# Patient Record
Sex: FEMALE | Race: BLACK OR AFRICAN AMERICAN | URBAN - METROPOLITAN AREA
[De-identification: names, ages, dates, MRNs, and addresses within clinical notes are randomized per-mention and may not be internally consistent; named-entity substitution may affect disease eponyms.]

---

## 2023-06-13 ENCOUNTER — OFFICE VISIT (OUTPATIENT)
Dept: URBAN - METROPOLITAN AREA CLINIC 92 | Facility: CLINIC | Age: 48
End: 2023-06-13

## 2023-07-21 ENCOUNTER — OFFICE VISIT (OUTPATIENT)
Dept: URBAN - METROPOLITAN AREA CLINIC 92 | Facility: CLINIC | Age: 48
End: 2023-07-21
Payer: COMMERCIAL

## 2023-07-21 ENCOUNTER — DASHBOARD ENCOUNTERS (OUTPATIENT)
Age: 48
End: 2023-07-21

## 2023-07-21 ENCOUNTER — WEB ENCOUNTER (OUTPATIENT)
Dept: URBAN - METROPOLITAN AREA CLINIC 92 | Facility: CLINIC | Age: 48
End: 2023-07-21

## 2023-07-21 VITALS
WEIGHT: 215 LBS | SYSTOLIC BLOOD PRESSURE: 139 MMHG | TEMPERATURE: 97.7 F | HEIGHT: 67 IN | BODY MASS INDEX: 33.74 KG/M2 | DIASTOLIC BLOOD PRESSURE: 89 MMHG | HEART RATE: 83 BPM

## 2023-07-21 DIAGNOSIS — Z80.0 FAMILY HISTORY OF COLON CANCER IN FATHER: ICD-10-CM

## 2023-07-21 PROCEDURE — 99202 OFFICE O/P NEW SF 15 MIN: CPT

## 2023-07-21 NOTE — HPI-TODAY'S VISIT:
48 year-old female who presents for a colon cancer screening.   Last colonoscopy: never  Father with colon CA age 80s?  has regular BM daily. Patient denies nausea, heartburn, dysphagia, abdominal pain, rectal bleeding, melena, unintentional weight loss, changes in bowel habits and loss of appetite. Patinet denies blood thinner use, pacemaker/defibrillator, diabetes, kidney disease, and home O2.

## 2023-08-04 ENCOUNTER — CLAIMS CREATED FROM THE CLAIM WINDOW (OUTPATIENT)
Dept: URBAN - METROPOLITAN AREA CLINIC 4 | Facility: CLINIC | Age: 48
End: 2023-08-04
Payer: COMMERCIAL

## 2023-08-04 ENCOUNTER — OFFICE VISIT (OUTPATIENT)
Dept: URBAN - METROPOLITAN AREA SURGERY CENTER 16 | Facility: SURGERY CENTER | Age: 48
End: 2023-08-04
Payer: COMMERCIAL

## 2023-08-04 DIAGNOSIS — Z12.11 COLON CANCER SCREENING: ICD-10-CM

## 2023-08-04 DIAGNOSIS — K63.89 OTHER SPECIFIED DISEASES OF INTESTINE: ICD-10-CM

## 2023-08-04 DIAGNOSIS — K63.5 BENIGN COLON POLYP: ICD-10-CM

## 2023-08-04 DIAGNOSIS — Z80.0 BROTHER AT YOUNG AGE FAMILY HISTORY OF COLON CANCER: ICD-10-CM

## 2023-08-04 PROCEDURE — 88305 TISSUE EXAM BY PATHOLOGIST: CPT | Performed by: PATHOLOGY

## 2023-08-04 PROCEDURE — 45380 COLONOSCOPY AND BIOPSY: CPT | Performed by: INTERNAL MEDICINE

## 2023-08-04 PROCEDURE — G8907 PT DOC NO EVENTS ON DISCHARG: HCPCS | Performed by: INTERNAL MEDICINE

## 2023-09-11 ENCOUNTER — OFFICE VISIT (OUTPATIENT)
Dept: URBAN - METROPOLITAN AREA CLINIC 92 | Facility: CLINIC | Age: 48
End: 2023-09-11

## 2023-09-11 NOTE — HPI-TODAY'S VISIT:
48 year-old female who presents for a colon cancer screening.   Last colonoscopy: never  Father with colon CA age 80s?  has regular BM daily. Patient denies nausea, heartburn, dysphagia, abdominal pain, rectal bleeding, melena, unintentional weight loss, changes in bowel habits and loss of appetite. Patinet denies blood thinner use, pacemaker/defibrillator, diabetes, kidney disease, and home O2.   Colon 8/4/23: IH, 2 hyperplastic polyps, repeat 5 years